# Patient Record
Sex: MALE | Race: OTHER | HISPANIC OR LATINO
[De-identification: names, ages, dates, MRNs, and addresses within clinical notes are randomized per-mention and may not be internally consistent; named-entity substitution may affect disease eponyms.]

---

## 2021-04-19 ENCOUNTER — APPOINTMENT (OUTPATIENT)
Dept: OTOLARYNGOLOGY | Facility: CLINIC | Age: 66
End: 2021-04-19

## 2021-04-19 PROBLEM — Z00.00 ENCOUNTER FOR PREVENTIVE HEALTH EXAMINATION: Status: ACTIVE | Noted: 2021-04-19

## 2021-05-11 ENCOUNTER — APPOINTMENT (OUTPATIENT)
Dept: OTOLARYNGOLOGY | Facility: CLINIC | Age: 66
End: 2021-05-11
Payer: MEDICARE

## 2021-05-11 VITALS
HEART RATE: 82 BPM | OXYGEN SATURATION: 98 % | SYSTOLIC BLOOD PRESSURE: 162 MMHG | BODY MASS INDEX: 28.88 KG/M2 | TEMPERATURE: 98.2 F | DIASTOLIC BLOOD PRESSURE: 99 MMHG | WEIGHT: 195 LBS | HEIGHT: 69 IN

## 2021-05-11 DIAGNOSIS — Z86.79 PERSONAL HISTORY OF OTHER DISEASES OF THE CIRCULATORY SYSTEM: ICD-10-CM

## 2021-05-11 PROCEDURE — 99203 OFFICE O/P NEW LOW 30 MIN: CPT | Mod: 25

## 2021-05-11 PROCEDURE — 99072 ADDL SUPL MATRL&STAF TM PHE: CPT

## 2021-05-11 PROCEDURE — 31231 NASAL ENDOSCOPY DX: CPT

## 2021-05-11 RX ORDER — FLUTICASONE FUROATE 27.5 UG/1
27.5 SPRAY, METERED NASAL DAILY
Qty: 2 | Refills: 6 | Status: ACTIVE | COMMUNITY
Start: 2021-05-11 | End: 1900-01-01

## 2021-05-11 NOTE — REVIEW OF SYSTEMS
[Patient Intake Form Reviewed] : Patient intake form was reviewed [Ear Pain] : ear pain [Nasal Congestion] : nasal congestion [Nose Bleeds] : nose bleeds [Swelling Neck] : swelling neck [Dry Eyes] : dry eyes [As Noted in HPI] : as noted in HPI [Arthralgias] : arthralgias [Joint Pain] : joint pain [Negative] : Heme/Lymph

## 2021-06-29 NOTE — PROCEDURE
[Congested] : congested [Image(s) Captured] : image(s) captured and filed [Topical Lidocaine] : topical lidocaine [Oxymetazoline HCl] : oxymetazoline HCl [Flexible Endoscope] : examined with the flexible endoscope [Serial Number: ___] : Serial Number: [unfilled] [FreeTextEntry6] :  bilateral Otalgia and pressure.  Deviated nasal Septum and Turbinate Hypertrophy.  Rhinitis  [de-identified] :   Deviated nasal Septum and Turbinate Hypertrophy.  Rhinitis

## 2021-06-29 NOTE — PHYSICAL EXAM
[] : septum deviated to the left [Normal] : mucosa is normal [Midline] : trachea located in midline position [de-identified] :  bilateral Otalgia and pressure.  Deviated nasal Septum and Turbinate Hypertrophy.  Rhinitis

## 2021-06-29 NOTE — CONSULT LETTER
[Dear  ___] : Dear  [unfilled], [Consult Letter:] : I had the pleasure of evaluating your patient, [unfilled]. [Please see my note below.] : Please see my note below. [Consult Closing:] : Thank you very much for allowing me to participate in the care of this patient.  If you have any questions, please do not hesitate to contact me. [Sincerely,] : Sincerely, [FreeTextEntry3] : Rj Tobin MD, FACS\par Professor of Otolaryngology, [employer] at [facility]\par Director, [facility], Department of Otolaryngology, [facility]\par , Head & Neck Service Line, [facility]\par

## 2021-06-29 NOTE — HISTORY OF PRESENT ILLNESS
[de-identified] : 65 years old male patient with history of Persistent nasal and sinus pressure for the past 3 years.  Patient is present today in the office with bilateral Otalgia and pressure.  Deviated nasal Septum and Turbinate Hypertrophy.  Rhinitis

## 2023-06-28 ENCOUNTER — APPOINTMENT (OUTPATIENT)
Dept: OTOLARYNGOLOGY | Facility: CLINIC | Age: 68
End: 2023-06-28
Payer: MEDICARE

## 2023-06-28 VITALS
DIASTOLIC BLOOD PRESSURE: 89 MMHG | TEMPERATURE: 97.9 F | HEART RATE: 52 BPM | HEIGHT: 69 IN | WEIGHT: 194 LBS | SYSTOLIC BLOOD PRESSURE: 163 MMHG | OXYGEN SATURATION: 99 % | BODY MASS INDEX: 28.73 KG/M2

## 2023-06-28 PROCEDURE — 99213 OFFICE O/P EST LOW 20 MIN: CPT | Mod: 25

## 2023-06-28 PROCEDURE — 31231 NASAL ENDOSCOPY DX: CPT

## 2023-06-28 NOTE — REASON FOR VISIT
[Subsequent Evaluation] : a subsequent evaluation for [FreeTextEntry2] : Persistent nasal and sinus pressure since 2021.   Patient states his level of severity is a level 6 out of 10 and it occurs constant.  Patient states nothing helps to improve or worsens his Persistent nasal and sinus  since 2021.

## 2023-06-28 NOTE — PHYSICAL EXAM
[] : septum deviated to the left [Midline] : trachea located in midline position [Normal] : no rashes [de-identified] :  bilateral Otalgia and pressure.  Deviated nasal Septum and Turbinate Hypertrophy.  Rhinitis

## 2023-06-28 NOTE — REVIEW OF SYSTEMS
[Patient Intake Form Reviewed] : Patient intake form was reviewed [Ear Pain] : ear pain [Nasal Congestion] : nasal congestion [Sinus Pain] : sinus pain [Sinus Pressure] : sinus pressure [Swelling Neck] : swelling neck [Dry Eyes] : dry eyes [As Noted in HPI] : as noted in HPI [Arthralgias] : arthralgias [Joint Pain] : joint pain [Negative] : Heme/Lymph [Nose Bleeds] : no nose bleeds

## 2023-06-28 NOTE — HISTORY OF PRESENT ILLNESS
[de-identified] : 65 years old male patient with history of Persistent nasal and sinus pressure since 2021.  Patient is present today in the office with C/O bilateral facial pressure and pain.   Occasional  bilateral otalgia and pressure.  Deviated nasal Septum and Turbinate Hypertrophy.  Rhinitis

## 2023-06-28 NOTE — PROCEDURE
[Congested] : congested [Image(s) Captured] : image(s) captured and filed [Topical Lidocaine] : topical lidocaine [Oxymetazoline HCl] : oxymetazoline HCl [Flexible Endoscope] : examined with the flexible endoscope [Serial Number: ___] : Serial Number: [unfilled] [Deviated to the Rt] : deviated to the right [FreeTextEntry6] :  bilateral Otalgia and pressure.  Deviated nasal Septum and Turbinate Hypertrophy.  Rhinitis  [de-identified] :   Deviated nasal Septum and Turbinate Hypertrophy.  Rhinitis

## 2023-08-09 ENCOUNTER — TRANSCRIPTION ENCOUNTER (OUTPATIENT)
Age: 68
End: 2023-08-09

## 2023-08-09 RX ORDER — ACETAMINOPHEN AND CODEINE PHOSPHATE 300; 30 MG/1; MG/1
300-30 TABLET ORAL
Qty: 25 | Refills: 0 | Status: ACTIVE | COMMUNITY
Start: 2023-08-09 | End: 1900-01-01

## 2023-08-09 NOTE — ASU PATIENT PROFILE, ADULT - NS PREOP UNDERSTANDS INFO
Time by MD to arrive at 06:00am. No solid food/dairy/candy/gum after 9:30pm tonight, water allowed before 04:30am tomorrow; patient reminded to come with photo ID/insurance/credit card; no smoking/alcohol drinking/recreational drug use tonight; dress in comfortable clothes; no jewelries/contact lens/valuables; escort to have a photo ID; address and callback number was given./yes

## 2023-08-09 NOTE — ASU PATIENT PROFILE, ADULT - NSICDXPASTSURGICALHX_GEN_ALL_CORE_FT
PAST SURGICAL HISTORY:  Bilateral derangement of medial meniscus     History of left hip replacement     S/P arthroscopy of right shoulder     S/P left inguinal hernia repair

## 2023-08-10 ENCOUNTER — RESULT REVIEW (OUTPATIENT)
Age: 68
End: 2023-08-10

## 2023-08-10 ENCOUNTER — TRANSCRIPTION ENCOUNTER (OUTPATIENT)
Age: 68
End: 2023-08-10

## 2023-08-10 ENCOUNTER — OUTPATIENT (OUTPATIENT)
Dept: OUTPATIENT SERVICES | Facility: HOSPITAL | Age: 68
LOS: 1 days | Discharge: HOME | End: 2023-08-10
Payer: MEDICARE

## 2023-08-10 ENCOUNTER — APPOINTMENT (OUTPATIENT)
Dept: OTOLARYNGOLOGY | Facility: AMBULATORY SURGERY CENTER | Age: 68
End: 2023-08-10

## 2023-08-10 VITALS
HEART RATE: 65 BPM | RESPIRATION RATE: 12 BRPM | OXYGEN SATURATION: 98 % | TEMPERATURE: 97 F | SYSTOLIC BLOOD PRESSURE: 166 MMHG | DIASTOLIC BLOOD PRESSURE: 81 MMHG

## 2023-08-10 VITALS
DIASTOLIC BLOOD PRESSURE: 79 MMHG | OXYGEN SATURATION: 99 % | HEART RATE: 59 BPM | SYSTOLIC BLOOD PRESSURE: 154 MMHG | WEIGHT: 173.94 LBS | TEMPERATURE: 98 F | RESPIRATION RATE: 16 BRPM | HEIGHT: 69 IN

## 2023-08-10 DIAGNOSIS — M23.303 OTHER MENISCUS DERANGEMENTS, UNSPECIFIED MEDIAL MENISCUS, RIGHT KNEE: Chronic | ICD-10-CM

## 2023-08-10 DIAGNOSIS — Z98.890 OTHER SPECIFIED POSTPROCEDURAL STATES: Chronic | ICD-10-CM

## 2023-08-10 DIAGNOSIS — R33.9 RETENTION OF URINE, UNSPECIFIED: ICD-10-CM

## 2023-08-10 DIAGNOSIS — Z96.642 PRESENCE OF LEFT ARTIFICIAL HIP JOINT: Chronic | ICD-10-CM

## 2023-08-10 PROCEDURE — 88300 SURGICAL PATH GROSS: CPT | Mod: 26

## 2023-08-10 PROCEDURE — 30117 REMOVAL OF INTRANASAL LESION: CPT

## 2023-08-10 PROCEDURE — 30520 REPAIR OF NASAL SEPTUM: CPT

## 2023-08-10 PROCEDURE — 30802 ABLATE INF TURBINATE SUBMUC: CPT

## 2023-08-10 DEVICE — LASER EZTIP E4-100MM: Type: IMPLANTABLE DEVICE | Status: FUNCTIONAL

## 2023-08-10 RX ORDER — HYDRALAZINE HCL 50 MG
10 TABLET ORAL ONCE
Refills: 0 | Status: COMPLETED | OUTPATIENT
Start: 2023-08-10 | End: 2023-08-10

## 2023-08-10 RX ORDER — LIDOCAINE HCL 20 MG/ML
5 VIAL (ML) INJECTION ONCE
Refills: 0 | Status: DISCONTINUED | OUTPATIENT
Start: 2023-08-10 | End: 2023-08-10

## 2023-08-10 RX ORDER — LISINOPRIL 2.5 MG/1
1 TABLET ORAL
Refills: 0 | DISCHARGE

## 2023-08-10 RX ORDER — ALLOPURINOL 300 MG
1 TABLET ORAL
Refills: 0 | DISCHARGE

## 2023-08-10 RX ORDER — TAMSULOSIN HYDROCHLORIDE 0.4 MG/1
0.8 CAPSULE ORAL AT BEDTIME
Refills: 0 | Status: DISCONTINUED | OUTPATIENT
Start: 2023-08-10 | End: 2023-08-10

## 2023-08-10 RX ORDER — FENTANYL CITRATE 50 UG/ML
25 INJECTION INTRAVENOUS
Refills: 0 | Status: DISCONTINUED | OUTPATIENT
Start: 2023-08-10 | End: 2023-08-10

## 2023-08-10 RX ORDER — SODIUM CHLORIDE 9 MG/ML
250 INJECTION, SOLUTION INTRAVENOUS ONCE
Refills: 0 | Status: DISCONTINUED | OUTPATIENT
Start: 2023-08-10 | End: 2023-08-10

## 2023-08-10 RX ORDER — LISINOPRIL 2.5 MG/1
20 TABLET ORAL ONCE
Refills: 0 | Status: COMPLETED | OUTPATIENT
Start: 2023-08-10 | End: 2023-08-10

## 2023-08-10 RX ORDER — SODIUM CHLORIDE 9 MG/ML
1000 INJECTION, SOLUTION INTRAVENOUS
Refills: 0 | Status: DISCONTINUED | OUTPATIENT
Start: 2023-08-10 | End: 2023-08-10

## 2023-08-10 RX ADMIN — LISINOPRIL 20 MILLIGRAM(S): 2.5 TABLET ORAL at 16:45

## 2023-08-10 RX ADMIN — Medication 10 MILLIGRAM(S): at 08:50

## 2023-08-10 RX ADMIN — SODIUM CHLORIDE 100 MILLILITER(S): 9 INJECTION, SOLUTION INTRAVENOUS at 09:02

## 2023-08-10 RX ADMIN — TAMSULOSIN HYDROCHLORIDE 0.8 MILLIGRAM(S): 0.4 CAPSULE ORAL at 16:06

## 2023-08-10 NOTE — PROCEDURE NOTE - NSURITECHNIQUE_GU_A_CORE
Proper hand hygiene was performed/Sterile gloves were worn for the duration of the procedure/A sterile drape was used to cover all adjacent areas/The site was cleaned with soap/water and sterile solution (betadine)/The catheter was appropriately lubricated/The catheter was secured with a securement device (e.g. StatLock)

## 2023-08-10 NOTE — BRIEF OPERATIVE NOTE - NSICDXBRIEFPREOP_GEN_ALL_CORE_FT
PRE-OP DIAGNOSIS:  Deviated nasal septum 10-Aug-2023 07:04:13  Agnieszka Marx A  Hypertrophy of both inferior nasal turbinates 10-Aug-2023 07:04:19  Agnieskza Marx A  Obstruction of nasal valve 10-Aug-2023 07:04:26  Agnieszka Marx A

## 2023-08-10 NOTE — BRIEF OPERATIVE NOTE - COMMENTS
Please schedule an appointment to see Dr. Rj Tobin in the outpatient department. ([facility])

## 2023-08-10 NOTE — ASU DISCHARGE PLAN (ADULT/PEDIATRIC) - ASU DC SPECIAL INSTRUCTIONSFT
Do not blow your nose for 2 weeks after surgery. Avoid lifting > 5 lbs. and no vigorous exercise for 2 weeks after surgery.    Bloody nasal drainage is normal after this surgery for 5-7 days, usually decreasing in volume with each day that passes. Drainage will flow from the front of the nose and down the back of the throat    Start all medications on Friday. Rollbar Sinus Rinse Kit / start rinse on Saturday. Rinse only once a day. Rinse only the mornings. Do not blow your nose for 2 weeks after surgery. Avoid lifting > 5 lbs. and no vigorous exercise for 2 weeks after surgery.    Bloody nasal drainage is normal after this surgery for 5-7 days, usually decreasing in volume with each day that passes. Drainage will flow from the front of the nose and down the back of the throat    Start all medications on Friday. NeilMed Sinus Rinse Kit / start rinse on Saturday. Rinse only once a day. Rinse only the mornings.    Office and appointment details:     9AM with Tiffany Sotelo NP for trial of void  [facility], [address], [place], [place] [address] [phone]    -Please continue taking Flomax  - Maintain Maciel catheter in place until your office appointment tomorrow Do not blow your nose for 2 weeks after surgery. Avoid lifting > 5 lbs. and no vigorous exercise for 2 weeks after surgery.    Bloody nasal drainage is normal after this surgery for 5-7 days, usually decreasing in volume with each day that passes. Drainage will flow from the front of the nose and down the back of the throat    Start all medications on Friday. NeilMed Sinus Rinse Kit / start rinse on Saturday. Rinse only once a day. Rinse only the mornings.    Office and appointment details:     9AM with Tiffany Sotelo NP for trial of void  [facility], [address], [place] [id number] [phone]    -Please continue taking Flomax  - Maintain Maciel catheter in place until your office appointment tomorrow  - Come to the office tomorrow at 9am for trial of void

## 2023-08-10 NOTE — ASU DISCHARGE PLAN (ADULT/PEDIATRIC) - NURSING INSTRUCTIONS
Do not blow your nose for 2 weeks after surgery. Avoid lifting > 5 lbs. and no vigorous exercise for 2 weeks after surgery.    Bloody nasal drainage is normal after this surgery for 5-7 days, usually decreasing in volume with each day that passes. Drainage will flow from the front of the nose and down the back of the throat    Start all medications on Friday. Buzzwire Sinus Rinse Kit / start rinse on Saturday. Rinse only once a day. Rinse only the mornings.

## 2023-08-10 NOTE — BRIEF OPERATIVE NOTE - NSICDXBRIEFPOSTOP_GEN_ALL_CORE_FT
POST-OP DIAGNOSIS:  Deviated nasal septum 10-Aug-2023 07:04:52  Agnieszka Marx A  Hypertrophy of both inferior nasal turbinates 10-Aug-2023 07:04:58  Agnieszka Marx  Obstruction of nasal valve 10-Aug-2023 07:05:04  Agneiszka Marx A

## 2023-08-10 NOTE — ASU DISCHARGE PLAN (ADULT/PEDIATRIC) - PROCEDURE
Open SMR, Submucous Resection of Nasal Septum, Ablation of Nasal Septum Obstruction, Turbinate Reduction , Laser Assisted, Diode Laser

## 2023-08-10 NOTE — ASU DISCHARGE PLAN (ADULT/PEDIATRIC) - MEDICATION INSTRUCTIONS
Start all medications on Friday. NeRed Karaoke Sinus Rinse Kit / start rinse on Saturday. Rinse only once a day. Rinse only the mornings.

## 2023-08-10 NOTE — PROCEDURE NOTE - ADDITIONAL PROCEDURE DETAILS
Office and appointment details:     9AM with Tiffany Sotelo NP for trial of void  [facility], [address], [place], [place] [id number] [phone]    -Please continue taking Flomax  - Maintain Maciel catheter in place until your office appointment tomorrow  - Come to the office tomorrow at 9am for trial of void

## 2023-08-10 NOTE — BRIEF OPERATIVE NOTE - NSICDXBRIEFPROCEDURE_GEN_ALL_CORE_FT
PROCEDURES:  SMR (submucous resection) 10-Aug-2023 07:03:39  Agnieszka Marx  Ablation, superficial soft tissue, inferior nasal turbinate 10-Aug-2023 07:03:52  Agnieszka Marx  Ablation, nasal turbinate, mucosa 10-Aug-2023 07:04:00  Agnieszka Marx

## 2023-08-10 NOTE — CHART NOTE - NSCHARTNOTEFT_GEN_A_CORE
Pt s/p SMR, recovering in PACU. Pt c/o need to void (no requirement to void prior to discharge by surgeon) since arriving in recovery room. Ambulated to bathroom and unable to pass urine; bladder scan revealed 499 ml in bladder. Advised RN to continue to encourage ambulation and possibility for straight cath- informed RN to call surgeon. Dr. Tobin informed and advised to give pt 4 hours for opportunity to void. Pt continued to attempt to void without success. Per Dr. Tobin, waited 4 hours and re-scanned pt which showed >700 ml retained in bladder. Dr. Tobin gave verbal order

## 2023-08-11 ENCOUNTER — APPOINTMENT (OUTPATIENT)
Dept: UROLOGY | Facility: CLINIC | Age: 68
End: 2023-08-11
Payer: MEDICARE

## 2023-08-11 VITALS
OXYGEN SATURATION: 99 % | SYSTOLIC BLOOD PRESSURE: 145 MMHG | HEART RATE: 58 BPM | BODY MASS INDEX: 25.92 KG/M2 | WEIGHT: 175 LBS | DIASTOLIC BLOOD PRESSURE: 81 MMHG | TEMPERATURE: 97.2 F | HEIGHT: 69 IN

## 2023-08-11 DIAGNOSIS — Z87.39 PERSONAL HISTORY OF OTHER DISEASES OF THE MUSCULOSKELETAL SYSTEM AND CONNECTIVE TISSUE: ICD-10-CM

## 2023-08-11 PROBLEM — I10 ESSENTIAL (PRIMARY) HYPERTENSION: Chronic | Status: ACTIVE | Noted: 2023-08-10

## 2023-08-11 PROBLEM — R97.20 ELEVATED PROSTATE SPECIFIC ANTIGEN [PSA]: Chronic | Status: ACTIVE | Noted: 2023-08-10

## 2023-08-11 PROBLEM — J34.2 DEVIATED NASAL SEPTUM: Chronic | Status: ACTIVE | Noted: 2023-08-10

## 2023-08-11 PROCEDURE — 99203 OFFICE O/P NEW LOW 30 MIN: CPT | Mod: 25

## 2023-08-11 PROCEDURE — 51798 US URINE CAPACITY MEASURE: CPT

## 2023-08-11 RX ORDER — METHYLPREDNISOLONE 4 MG/1
4 TABLET ORAL
Qty: 1 | Refills: 0 | Status: COMPLETED | COMMUNITY
Start: 2023-08-09 | End: 2023-08-11

## 2023-08-11 RX ORDER — AMOXICILLIN 500 MG/1
500 CAPSULE ORAL
Qty: 14 | Refills: 0 | Status: COMPLETED | COMMUNITY
Start: 2023-08-09 | End: 2023-08-11

## 2023-08-11 RX ORDER — ALLOPURINOL 300 MG/1
300 TABLET ORAL
Refills: 0 | Status: ACTIVE | COMMUNITY

## 2023-08-11 RX ORDER — LISINOPRIL 20 MG/1
20 TABLET ORAL
Refills: 0 | Status: ACTIVE | COMMUNITY

## 2023-08-11 NOTE — PHYSICAL EXAM
[General Appearance - Well Developed] : well developed [General Appearance - Well Nourished] : well nourished [Normal Appearance] : normal appearance [Well Groomed] : well groomed [General Appearance - In No Acute Distress] : no acute distress [] : no respiratory distress [Respiration, Rhythm And Depth] : normal respiratory rhythm and effort [Exaggerated Use Of Accessory Muscles For Inspiration] : no accessory muscle use [Abdomen Soft] : soft [Abdomen Tenderness] : non-tender [Penis Abnormality] : normal uncircumcised penis [Urinary Bladder Findings] : the bladder was normal on palpation [Normal Station and Gait] : the gait and station were normal for the patient's age [No Focal Deficits] : no focal deficits [Oriented To Time, Place, And Person] : oriented to person, place, and time [Affect] : the affect was normal [Mood] : the mood was normal [Not Anxious] : not anxious

## 2023-08-11 NOTE — ASSESSMENT
[FreeTextEntry1] : 68 yo Black man with urinary retention s/p septoplasty 8/10/23 presents for TOV.   1. TOV- passed. PVR 47cc 2. Discussed s/sx of urinary retention to be aware of 3. We did discuss starting alpha blocker but given hx of sinus congestion/pressure and recent surgery we will defer at this time  Advised on return precautions Follow up Dr Beal to establish care (with records from Dr Nathan Palencia)

## 2023-08-11 NOTE — HISTORY OF PRESENT ILLNESS
[FreeTextEntry1] : Dear Dr Lopez (PCP),   Thank you so much for the referral to help care for your patient.  Chief Complaint: Urinary retention/TOV Date of first visit: 8/11/2023 Prior urologist Dr Nathan Palencia (retired)  JIM SALGAOD  is a 67 year old Black man with PMHx HTN who presents for TOV. He is s/p septoplasty 8/10/23. He went into retention postop and catheter was inserted cystoscopically. No issues overnight. Hematuria resolved soon after insertion.  Prior to yesterday, he had no urinary complaints. Strong flow, felt empty, no alpha blockers. He had hip surgery 20 years ago and went into retention but had hernia surgery afterwards with no postop retention. He has seen a urologist in the past for routine PSA checks. He has never had a prostate biopsy or any outlet procedure.   Maciel bag draining clear yellow urine 180cc sterile water instilled Voided 120cc clear pink urine, stream started painfully/weak and FOS strengthened  PVR 47cc

## 2023-08-16 ENCOUNTER — APPOINTMENT (OUTPATIENT)
Dept: OTOLARYNGOLOGY | Facility: CLINIC | Age: 68
End: 2023-08-16
Payer: MEDICARE

## 2023-08-16 VITALS
SYSTOLIC BLOOD PRESSURE: 163 MMHG | HEART RATE: 68 BPM | OXYGEN SATURATION: 99 % | TEMPERATURE: 97.7 F | WEIGHT: 175 LBS | BODY MASS INDEX: 25.92 KG/M2 | HEIGHT: 69 IN | DIASTOLIC BLOOD PRESSURE: 90 MMHG

## 2023-08-16 PROCEDURE — 31237 NSL/SINS NDSC SURG BX POLYPC: CPT | Mod: 50,58

## 2023-08-16 PROCEDURE — 99024 POSTOP FOLLOW-UP VISIT: CPT

## 2023-08-16 NOTE — PROCEDURE
[Debridement] : debridement  [Bilateral] : bilateral debridement of the nasal cavity [Severe] : severe [Som] : on both sides [Removed] : which was removed

## 2023-08-16 NOTE — HISTORY OF PRESENT ILLNESS
[de-identified] : 67 years old male patient with history of Status post Open SMR, Turbinate Reduction, Ablation of Nasal Septum Obstruction Patient is present today in the office for Nasal debridement. Bacitracin ointment was inserted into patient nostrils for lubrication. Patient is healing according to plan.

## 2023-08-16 NOTE — PHYSICAL EXAM
[Normal] : no rashes [] : septum deviated to the left [de-identified] :  bilateral Otalgia and pressure.  Deviated nasal Septum and Turbinate Hypertrophy.  Rhinitis

## 2023-08-16 NOTE — REASON FOR VISIT
[Post-Operative Visit] : a post-operative visit [FreeTextEntry2] : Status post Open SMR, Turbinate Reduction, Ablation of Nasal Septum Obstruction

## 2023-08-21 LAB — SURGICAL PATHOLOGY STUDY: SIGNIFICANT CHANGE UP

## 2023-08-23 ENCOUNTER — APPOINTMENT (OUTPATIENT)
Dept: OTOLARYNGOLOGY | Facility: CLINIC | Age: 68
End: 2023-08-23
Payer: MEDICARE

## 2023-08-23 VITALS
HEIGHT: 69 IN | DIASTOLIC BLOOD PRESSURE: 83 MMHG | TEMPERATURE: 97.7 F | HEART RATE: 60 BPM | BODY MASS INDEX: 25.48 KG/M2 | WEIGHT: 172 LBS | SYSTOLIC BLOOD PRESSURE: 163 MMHG | OXYGEN SATURATION: 99 %

## 2023-08-23 DIAGNOSIS — J34.3 HYPERTROPHY OF NASAL TURBINATES: ICD-10-CM

## 2023-08-23 PROCEDURE — 31237 NSL/SINS NDSC SURG BX POLYPC: CPT | Mod: 50,58,78

## 2023-08-23 PROCEDURE — 99024 POSTOP FOLLOW-UP VISIT: CPT

## 2023-08-23 RX ORDER — IBUPROFEN 800 MG/1
800 TABLET, FILM COATED ORAL 3 TIMES DAILY
Qty: 1 | Refills: 1 | Status: ACTIVE | COMMUNITY
Start: 2023-08-23 | End: 1900-01-01

## 2023-08-23 NOTE — PHYSICAL EXAM
[] : septum deviated to the left [Normal] : no rashes [de-identified] :  bilateral Otalgia and pressure.  Deviated nasal Septum and Turbinate Hypertrophy.  Rhinitis

## 2023-08-23 NOTE — HISTORY OF PRESENT ILLNESS
[de-identified] : 67 years old male patient with history of Status post Open SMR, Turbinate Reduction, Ablation of Nasal Septum Obstruction Patient is present today in the office for Nasal debridement. Bacitracin ointment was inserted into patient nostrils for lubrication. Patient is healing according to plan.

## 2023-08-25 NOTE — HISTORY OF PRESENT ILLNESS
[FreeTextEntry1] : Dear BRENDA Marina (Urologist) Dear NNAMDI Tapia (PCP)   Thank you so much for the referral to help care for your patient.   Chief Complaint: Establish Care Date of first visit: 09/11/2023   JIM SALGADO, is a 67 year old ~race man ~ who presents to Establish Care.   PSA Hx:  6.23        11/22/22  5.99        08/11/22 5.62        05/05/22 6.92        11/12/21 6.01        05/14/21 4.51        01/08/21 6.72        09/17/20 5.05        05/28/20 5.29        01/27/20  8.13        11/11/19 6.50        08/12/19 5.14        04/29/19 6.88        01/14/19 09/11/2023 IPSS    QOL  LESIA  EPIC 26    The patient denies fevers, chills, nausea and or vomiting and no unexplained weight loss.   All pertinent laboratory, films and physician notes were reviewed.  Questionnaire results were discussed with patient.

## 2023-08-28 ENCOUNTER — APPOINTMENT (OUTPATIENT)
Dept: OPHTHALMOLOGY | Facility: CLINIC | Age: 68
End: 2023-08-28
Payer: MEDICARE

## 2023-08-28 ENCOUNTER — NON-APPOINTMENT (OUTPATIENT)
Age: 68
End: 2023-08-28

## 2023-08-28 ENCOUNTER — APPOINTMENT (OUTPATIENT)
Dept: OTOLARYNGOLOGY | Facility: CLINIC | Age: 68
End: 2023-08-28
Payer: MEDICARE

## 2023-08-28 VITALS
HEART RATE: 51 BPM | SYSTOLIC BLOOD PRESSURE: 180 MMHG | WEIGHT: 170 LBS | OXYGEN SATURATION: 99 % | DIASTOLIC BLOOD PRESSURE: 97 MMHG | TEMPERATURE: 97.3 F | BODY MASS INDEX: 24.34 KG/M2 | HEIGHT: 70 IN

## 2023-08-28 DIAGNOSIS — J34.89 OTHER SPECIFIED DISORDERS OF NOSE AND NASAL SINUSES: ICD-10-CM

## 2023-08-28 DIAGNOSIS — J31.0 CHRONIC RHINITIS: ICD-10-CM

## 2023-08-28 DIAGNOSIS — H57.89 OTHER SPECIFIED DISORDERS OF EYE AND ADNEXA: ICD-10-CM

## 2023-08-28 DIAGNOSIS — J34.2 DEVIATED NASAL SEPTUM: ICD-10-CM

## 2023-08-28 DIAGNOSIS — H57.13 OCULAR PAIN, BILATERAL: ICD-10-CM

## 2023-08-28 PROCEDURE — 31237 NSL/SINS NDSC SURG BX POLYPC: CPT | Mod: 50,58

## 2023-08-28 PROCEDURE — 92004 COMPRE OPH EXAM NEW PT 1/>: CPT

## 2023-08-28 PROCEDURE — 99024 POSTOP FOLLOW-UP VISIT: CPT

## 2023-08-28 NOTE — HISTORY OF PRESENT ILLNESS
[de-identified] : 67 years old male patient with history of Status post Open SMR, Turbinate Reduction, Ablation of Nasal Septum Obstruction Patient is present today in the office for Nasal debridement. Bacitracin ointment was inserted into patient nostrils for lubrication. Patient is healing according to plan.

## 2023-08-28 NOTE — PHYSICAL EXAM
[] : septum deviated to the left [Normal] : no rashes [de-identified] :  bilateral Otalgia and pressure.  Deviated nasal Septum and Turbinate Hypertrophy.  Rhinitis

## 2023-08-31 ENCOUNTER — APPOINTMENT (OUTPATIENT)
Dept: OPHTHALMOLOGY | Facility: CLINIC | Age: 68
End: 2023-08-31
Payer: MEDICARE

## 2023-08-31 ENCOUNTER — NON-APPOINTMENT (OUTPATIENT)
Age: 68
End: 2023-08-31

## 2023-08-31 PROCEDURE — 92014 COMPRE OPH EXAM EST PT 1/>: CPT

## 2023-08-31 PROCEDURE — 92083 EXTENDED VISUAL FIELD XM: CPT

## 2023-09-11 ENCOUNTER — APPOINTMENT (OUTPATIENT)
Dept: UROLOGY | Facility: CLINIC | Age: 68
End: 2023-09-11
Payer: MEDICARE

## 2023-09-11 VITALS
OXYGEN SATURATION: 99 % | DIASTOLIC BLOOD PRESSURE: 95 MMHG | TEMPERATURE: 98.3 F | HEART RATE: 99 BPM | SYSTOLIC BLOOD PRESSURE: 169 MMHG

## 2023-09-11 PROCEDURE — 99214 OFFICE O/P EST MOD 30 MIN: CPT

## 2023-09-13 ENCOUNTER — NON-APPOINTMENT (OUTPATIENT)
Age: 68
End: 2023-09-13

## 2023-09-13 LAB
PSA FREE FLD-MCNC: 8 %
PSA FREE SERPL-MCNC: 1.56 NG/ML
PSA SERPL-MCNC: 18.8 NG/ML

## 2023-09-26 ENCOUNTER — NON-APPOINTMENT (OUTPATIENT)
Age: 68
End: 2023-09-26

## 2023-09-26 LAB
APPEARANCE: CLEAR
BACTERIA: NEGATIVE /HPF
BILIRUBIN URINE: NEGATIVE
BLOOD URINE: NEGATIVE
CAST: 1 /LPF
COLOR: YELLOW
EPITHELIAL CELLS: 0 /HPF
GLUCOSE QUALITATIVE U: NEGATIVE MG/DL
KETONES URINE: NEGATIVE MG/DL
LEUKOCYTE ESTERASE URINE: NEGATIVE
MICROSCOPIC-UA: NORMAL
NITRITE URINE: NEGATIVE
PH URINE: 6
PROTEIN URINE: NEGATIVE MG/DL
PSA SERPL-MCNC: 8.23 NG/ML
RED BLOOD CELLS URINE: 0 /HPF
SPECIFIC GRAVITY URINE: 1.02
UROBILINOGEN URINE: 0.2 MG/DL
WHITE BLOOD CELLS URINE: 4 /HPF

## 2023-09-27 LAB — BACTERIA UR CULT: NORMAL

## 2023-10-16 ENCOUNTER — OUTPATIENT (OUTPATIENT)
Dept: OUTPATIENT SERVICES | Facility: HOSPITAL | Age: 68
LOS: 1 days | End: 2023-10-16

## 2023-10-16 ENCOUNTER — APPOINTMENT (OUTPATIENT)
Dept: MRI IMAGING | Facility: CLINIC | Age: 68
End: 2023-10-16
Payer: MEDICARE

## 2023-10-16 ENCOUNTER — RESULT REVIEW (OUTPATIENT)
Age: 68
End: 2023-10-16

## 2023-10-16 DIAGNOSIS — M23.303 OTHER MENISCUS DERANGEMENTS, UNSPECIFIED MEDIAL MENISCUS, RIGHT KNEE: Chronic | ICD-10-CM

## 2023-10-16 DIAGNOSIS — Z98.890 OTHER SPECIFIED POSTPROCEDURAL STATES: Chronic | ICD-10-CM

## 2023-10-16 DIAGNOSIS — Z96.642 PRESENCE OF LEFT ARTIFICIAL HIP JOINT: Chronic | ICD-10-CM

## 2023-10-16 PROCEDURE — 72197 MRI PELVIS W/O & W/DYE: CPT | Mod: 26

## 2023-10-16 PROCEDURE — 76498P: CUSTOM | Mod: 26

## 2023-10-18 ENCOUNTER — NON-APPOINTMENT (OUTPATIENT)
Age: 68
End: 2023-10-18

## 2023-10-19 ENCOUNTER — NON-APPOINTMENT (OUTPATIENT)
Age: 68
End: 2023-10-19

## 2023-10-30 ENCOUNTER — APPOINTMENT (OUTPATIENT)
Dept: UROLOGY | Facility: CLINIC | Age: 68
End: 2023-10-30
Payer: MEDICARE

## 2023-10-30 VITALS
HEIGHT: 70 IN | WEIGHT: 170 LBS | BODY MASS INDEX: 24.34 KG/M2 | TEMPERATURE: 98 F | DIASTOLIC BLOOD PRESSURE: 93 MMHG | SYSTOLIC BLOOD PRESSURE: 178 MMHG | HEART RATE: 67 BPM | OXYGEN SATURATION: 97 %

## 2023-10-30 DIAGNOSIS — R33.9 RETENTION OF URINE, UNSPECIFIED: ICD-10-CM

## 2023-10-30 PROCEDURE — 99214 OFFICE O/P EST MOD 30 MIN: CPT

## 2024-05-01 ENCOUNTER — APPOINTMENT (OUTPATIENT)
Dept: UROLOGY | Facility: CLINIC | Age: 69
End: 2024-05-01

## 2024-05-10 NOTE — HISTORY OF PRESENT ILLNESS
[FreeTextEntry1] : Dear NNAMDI Tapia (PCP)  Thank you so much for the referral to help care for your patient.  Chief Complaint: BPH, elevated PSA Date of first visit: 8/11/2023 Prior urologist Dr Nathan Palencia (retired)  JIM SALGADO, is a 68 year old Black man with PMHx HTN who presents for follow up. He is s/p septoplasty 8/10/23. He went into retention postop and catheter was inserted cystoscopically. Prior to septoplasty, he had no urinary complaints. Strong flow, felt empty, no alpha blockers. He had hip surgery 20 years ago and went into retention but had hernia surgery afterwards with no postop retention. He has seen a urologist in the past for routine PSA checks. He has never had a prostate biopsy or any outlet procedure. No family hx of  malignancies.  He has been voiding without issue since TOV. Not on alpha blocker nor interested in starting. He states that he has been happy with his voiding status. Denies any gross hematuria, dysuria/UTIs, further episodes of retention.  PSA Hx: 8.23 09/25/23. PSAD 0.06 ng/ml/cc 18.80 09/11/23 6.23 11/22/22 5.99 08/11/22 5.62 05/05/22 6.92 11/12/21 6.01 05/14/21 4.51 01/08/21 6.72 09/17/20 5.05 05/28/20 5.29 01/27/20 8.13 11/11/19 6.50 08/12/19 5.14 04/29/19 6.88 01/14/19  MRI Hx: MRI at [facility] on 10/16/2023. 119.4 cc prostate (Nondiagnostic study due to extensive artifact on the diffusion-weighted sequence caused by the left hip metal hardware, BPH). No LAD No EPE, No Bony Lesions. The images have been reviewed and clinical implications discussed with the patient.  05/15/2024 IPSS          QOL LESIA Flow/PVR:  10/30/2023 IPSS 0 QOL 1 LESIA 24 Flow/PVR: Qmax 5.6 ml/s, Qavg 3.7 ml/s, vv 47.5 ml; PVR 0cc  09/11/2023 IPSS 6 QOL 1 LESIA 25 Flow/PVR: peak 8.1 ml/s, vv 86cc not valid. PVR 6cc  Prostate cancer screening: the patient and I spoke at length about prostate cancer screening, its risks and its benefits. The patient has 2 (age, PSA) risk factors for prostate cancer. He understands that many men with prostate cancer will die with the disease rather than of it and we also discussed the results large multi-center American and  prostate cancer screening trials. He also understands that PSA in and of itself does not diagnose prostate cancer but only assesses risk to a certain degree. The patient understands that to definitively screen for prostate cancer, a biopsy is required and this procedure has risks, including bleeding, infection, ED and urinary retention.  The patient denies fevers, chills, nausea and or vomiting and no unexplained weight loss.  All pertinent laboratory, films and physician notes were reviewed. Questionnaire results were discussed with patient.  I spent 31 minutes of non-face-to-face time reviewing the patient's records chart, uploading processing MR images, transferring MR images from PACS to an independent workstation, reviewing images on an independent workstation, speaking with their physician and planning their prostate biopsy and preparation of an upcoming visit for 10/30/2023 . The imaging and planning was highly complex and took this entire time.  The prostate MRI has been reviewed with the patient (images and report). There are no suspicious lesions on 2nd look. Reviewing multiple studies, the probability of having prostate cancer with a negative prostate MRI is ~ 20%. However, the probability of having clinically significant disease is <5% of those positive. A recent study from [facility] presented level 1b evidence that an MRI can help avoid patient's biopsies and only misses ~ 3% of clinically significant diseases.  I have discussed these details at length with the patient. He is aware of the pro's and con's of prostate cancer screening. Taking into account his PSA, Family History and LISSY findings. He wishes at this time to avoid a biopsy and will continue to undergo PSA based screening. If the PSA continues to increase or there is increasing clinical suspicion, we will repeat MR imaging of the prostate prior to any intervention due to risks associated with the prostate biopsy. The patient understands that a prostate biopsy is still the standard of care with regards to screening, and MRI is an adjunct that can help localize and target more suspicious areas. In conclusion, he would like to hold off on the biopsy at this time.

## 2024-05-10 NOTE — ASSESSMENT
[FreeTextEntry1] : 69 yo Black man with urinary retention s/p septoplasty 8/10/23 presents for follow up. His PSA is elevated 8.2 ng/ml. Negative MRI, normal PSAD, biopsy naive, neg FH, neg LISSY.  Elevated PSA - MRI reviewed 10/30/23- agree with negative read. - PSA q6mo with Ophelia / Rastinehad at 1 year.  BPH w/ episode of urinary retention - small median lobe. he would be a good PAE candidate if interested (happy at this time) - No further episodes of retention (likely 2/2 anesthesia), PVR today (10/30/23) 0cc  Thank you very much for allowing me to assist in the care of this patient. Please do not hesitate to contact me with any additional questions or concerns.

## 2024-05-15 ENCOUNTER — APPOINTMENT (OUTPATIENT)
Dept: UROLOGY | Facility: CLINIC | Age: 69
End: 2024-05-15
Payer: MEDICARE

## 2024-05-15 VITALS
HEART RATE: 67 BPM | OXYGEN SATURATION: 98 % | HEIGHT: 70 IN | SYSTOLIC BLOOD PRESSURE: 166 MMHG | DIASTOLIC BLOOD PRESSURE: 92 MMHG | TEMPERATURE: 98.2 F

## 2024-05-15 DIAGNOSIS — R97.20 ELEVATED PROSTATE, SPECIFIC ANTIGEN [PSA]: ICD-10-CM

## 2024-05-15 DIAGNOSIS — N40.1 BENIGN PROSTATIC HYPERPLASIA WITH LOWER URINARY TRACT SYMPMS: ICD-10-CM

## 2024-05-15 PROCEDURE — 99213 OFFICE O/P EST LOW 20 MIN: CPT

## 2024-05-15 RX ORDER — DOCUSATE SODIUM 100 MG/1
100 CAPSULE ORAL TWICE DAILY
Qty: 60 | Refills: 0 | Status: DISCONTINUED | COMMUNITY
Start: 2023-08-09 | End: 2024-05-15

## 2024-05-16 LAB — PSA SERPL-MCNC: 8.33 NG/ML

## 2024-06-25 ENCOUNTER — NON-APPOINTMENT (OUTPATIENT)
Age: 69
End: 2024-06-25

## 2024-07-29 ENCOUNTER — APPOINTMENT (OUTPATIENT)
Dept: OTOLARYNGOLOGY | Facility: CLINIC | Age: 69
End: 2024-07-29

## 2024-11-20 ENCOUNTER — APPOINTMENT (OUTPATIENT)
Dept: UROLOGY | Facility: CLINIC | Age: 69
End: 2024-11-20
Payer: MEDICARE

## 2024-11-20 ENCOUNTER — NON-APPOINTMENT (OUTPATIENT)
Age: 69
End: 2024-11-20

## 2024-11-20 DIAGNOSIS — N40.1 BENIGN PROSTATIC HYPERPLASIA WITH LOWER URINARY TRACT SYMPMS: ICD-10-CM

## 2024-11-20 DIAGNOSIS — R97.20 ELEVATED PROSTATE, SPECIFIC ANTIGEN [PSA]: ICD-10-CM

## 2024-11-20 PROCEDURE — 99213 OFFICE O/P EST LOW 20 MIN: CPT

## 2024-11-21 LAB
PSA FREE FLD-MCNC: 15 %
PSA FREE SERPL-MCNC: 1.32 NG/ML
PSA SERPL-MCNC: 8.6 NG/ML

## 2024-11-22 ENCOUNTER — TRANSCRIPTION ENCOUNTER (OUTPATIENT)
Age: 69
End: 2024-11-22

## 2024-11-27 ENCOUNTER — NON-APPOINTMENT (OUTPATIENT)
Age: 69
End: 2024-11-27

## 2024-11-27 ENCOUNTER — TRANSCRIPTION ENCOUNTER (OUTPATIENT)
Age: 69
End: 2024-11-27

## 2024-12-02 ENCOUNTER — NON-APPOINTMENT (OUTPATIENT)
Age: 69
End: 2024-12-02

## 2025-03-18 ENCOUNTER — TRANSCRIPTION ENCOUNTER (OUTPATIENT)
Age: 70
End: 2025-03-18

## 2025-05-22 ENCOUNTER — APPOINTMENT (OUTPATIENT)
Dept: UROLOGY | Facility: CLINIC | Age: 70
End: 2025-05-22
Payer: MEDICARE

## 2025-05-22 ENCOUNTER — NON-APPOINTMENT (OUTPATIENT)
Age: 70
End: 2025-05-22

## 2025-05-22 VITALS
HEART RATE: 59 BPM | SYSTOLIC BLOOD PRESSURE: 156 MMHG | DIASTOLIC BLOOD PRESSURE: 84 MMHG | BODY MASS INDEX: 24.34 KG/M2 | HEIGHT: 70 IN | OXYGEN SATURATION: 98 % | WEIGHT: 170 LBS | TEMPERATURE: 98.4 F

## 2025-05-22 DIAGNOSIS — N40.1 BENIGN PROSTATIC HYPERPLASIA WITH LOWER URINARY TRACT SYMPMS: ICD-10-CM

## 2025-05-22 DIAGNOSIS — R97.20 ELEVATED PROSTATE, SPECIFIC ANTIGEN [PSA]: ICD-10-CM

## 2025-05-22 PROCEDURE — 99213 OFFICE O/P EST LOW 20 MIN: CPT

## 2025-05-23 LAB
PSA FREE FLD-MCNC: 16 %
PSA FREE SERPL-MCNC: 1.68 NG/ML
PSA SERPL-MCNC: 10.8 NG/ML

## 2025-06-18 LAB
APPEARANCE: CLEAR
BACTERIA: NEGATIVE /HPF
BILIRUBIN URINE: NEGATIVE
BLOOD URINE: NEGATIVE
CAST: 0 /LPF
COLOR: YELLOW
EPITHELIAL CELLS: 0 /HPF
GLUCOSE QUALITATIVE U: NEGATIVE MG/DL
KETONES URINE: NEGATIVE MG/DL
LEUKOCYTE ESTERASE URINE: ABNORMAL
MICROSCOPIC-UA: NORMAL
NITRITE URINE: NEGATIVE
PH URINE: 6.5
PROTEIN URINE: NEGATIVE MG/DL
RED BLOOD CELLS URINE: 0 /HPF
SPECIFIC GRAVITY URINE: 1.02
UROBILINOGEN URINE: 0.2 MG/DL
WHITE BLOOD CELLS URINE: 2 /HPF

## 2025-06-19 ENCOUNTER — TRANSCRIPTION ENCOUNTER (OUTPATIENT)
Age: 70
End: 2025-06-19

## 2025-06-19 LAB — BACTERIA UR CULT: NORMAL

## 2025-07-19 ENCOUNTER — APPOINTMENT (OUTPATIENT)
Dept: MRI IMAGING | Facility: CLINIC | Age: 70
End: 2025-07-19

## 2025-07-30 ENCOUNTER — RESULT REVIEW (OUTPATIENT)
Age: 70
End: 2025-07-30

## 2025-07-30 ENCOUNTER — OUTPATIENT (OUTPATIENT)
Dept: OUTPATIENT SERVICES | Facility: HOSPITAL | Age: 70
LOS: 1 days | End: 2025-07-30

## 2025-07-30 ENCOUNTER — APPOINTMENT (OUTPATIENT)
Dept: MRI IMAGING | Facility: CLINIC | Age: 70
End: 2025-07-30
Payer: MEDICARE

## 2025-07-30 DIAGNOSIS — Z96.642 PRESENCE OF LEFT ARTIFICIAL HIP JOINT: Chronic | ICD-10-CM

## 2025-07-30 DIAGNOSIS — M23.303 OTHER MENISCUS DERANGEMENTS, UNSPECIFIED MEDIAL MENISCUS, RIGHT KNEE: Chronic | ICD-10-CM

## 2025-07-30 DIAGNOSIS — Z98.890 OTHER SPECIFIED POSTPROCEDURAL STATES: Chronic | ICD-10-CM

## 2025-07-30 PROCEDURE — 72197 MRI PELVIS W/O & W/DYE: CPT | Mod: 26

## 2025-07-30 PROCEDURE — 76498P: CUSTOM | Mod: 26

## 2025-08-06 ENCOUNTER — OUTPATIENT (OUTPATIENT)
Dept: OUTPATIENT SERVICES | Facility: HOSPITAL | Age: 70
LOS: 1 days | End: 2025-08-06
Payer: MEDICARE

## 2025-08-06 ENCOUNTER — RESULT REVIEW (OUTPATIENT)
Age: 70
End: 2025-08-06

## 2025-08-06 DIAGNOSIS — Z98.890 OTHER SPECIFIED POSTPROCEDURAL STATES: Chronic | ICD-10-CM

## 2025-08-06 DIAGNOSIS — R97.20 ELEVATED PROSTATE SPECIFIC ANTIGEN [PSA]: ICD-10-CM

## 2025-08-06 DIAGNOSIS — Z96.642 PRESENCE OF LEFT ARTIFICIAL HIP JOINT: Chronic | ICD-10-CM

## 2025-08-06 DIAGNOSIS — M23.303 OTHER MENISCUS DERANGEMENTS, UNSPECIFIED MEDIAL MENISCUS, RIGHT KNEE: Chronic | ICD-10-CM

## 2025-08-06 PROCEDURE — C8001: CPT

## 2025-09-02 ENCOUNTER — TRANSCRIPTION ENCOUNTER (OUTPATIENT)
Age: 70
End: 2025-09-02

## (undated) DEVICE — SYR LUER LOK 5CC

## (undated) DEVICE — GLV 6.5 PROTEXIS W HYDROGEL

## (undated) DEVICE — GLV 7.5 PROTEXIS (WHITE)

## (undated) DEVICE — SUT CHROMIC 2-0 27" SH

## (undated) DEVICE — PACK TONSIL ADENOID

## (undated) DEVICE — SOL ANTI FOG

## (undated) DEVICE — GOWN ROYAL SILK XL

## (undated) DEVICE — PETRI DISH MED 3.5"

## (undated) DEVICE — COTTONBALL LG

## (undated) DEVICE — SLV COMPRESSION KNEE MED

## (undated) DEVICE — GLV 6.5 PROTEXIS (WHITE)

## (undated) DEVICE — ELCTR BOVIE SUCTION 8FR 6"

## (undated) DEVICE — VENODYNE/SCD SLEEVE CALF MEDIUM

## (undated) DEVICE — WARMING BLANKET LOWER ADULT

## (undated) DEVICE — TUBING RAPIDVAC SMOKE EVACUATOR .25" X 10FT

## (undated) DEVICE — DRAPE TOWEL BLUE 17" X 24"

## (undated) DEVICE — PACK RHINOPLASTY

## (undated) DEVICE — SUT CHROMIC 4-0 18" PS-2